# Patient Record
Sex: FEMALE
[De-identification: names, ages, dates, MRNs, and addresses within clinical notes are randomized per-mention and may not be internally consistent; named-entity substitution may affect disease eponyms.]

---

## 2017-09-05 PROBLEM — Z00.00 ENCOUNTER FOR PREVENTIVE HEALTH EXAMINATION: Status: ACTIVE | Noted: 2017-09-05

## 2023-04-12 ENCOUNTER — NON-APPOINTMENT (OUTPATIENT)
Age: 73
End: 2023-04-12

## 2023-04-13 ENCOUNTER — NON-APPOINTMENT (OUTPATIENT)
Age: 73
End: 2023-04-13

## 2023-05-10 ENCOUNTER — APPOINTMENT (OUTPATIENT)
Dept: MRI IMAGING | Facility: CLINIC | Age: 73
End: 2023-05-10

## 2023-05-17 ENCOUNTER — APPOINTMENT (OUTPATIENT)
Dept: GASTROENTEROLOGY | Facility: CLINIC | Age: 73
End: 2023-05-17
Payer: MEDICARE

## 2023-05-17 ENCOUNTER — NON-APPOINTMENT (OUTPATIENT)
Age: 73
End: 2023-05-17

## 2023-05-17 VITALS
OXYGEN SATURATION: 98 % | HEIGHT: 67 IN | DIASTOLIC BLOOD PRESSURE: 70 MMHG | HEART RATE: 47 BPM | TEMPERATURE: 98.5 F | SYSTOLIC BLOOD PRESSURE: 100 MMHG | WEIGHT: 129 LBS | BODY MASS INDEX: 20.25 KG/M2

## 2023-05-17 DIAGNOSIS — K83.8 OTHER SPECIFIED DISEASES OF BILIARY TRACT: ICD-10-CM

## 2023-05-17 DIAGNOSIS — Z86.69 PERSONAL HISTORY OF OTHER DISEASES OF THE NERVOUS SYSTEM AND SENSE ORGANS: ICD-10-CM

## 2023-05-17 DIAGNOSIS — Z78.9 OTHER SPECIFIED HEALTH STATUS: ICD-10-CM

## 2023-05-17 DIAGNOSIS — Z86.39 PERSONAL HISTORY OF OTHER ENDOCRINE, NUTRITIONAL AND METABOLIC DISEASE: ICD-10-CM

## 2023-05-17 PROCEDURE — 99204 OFFICE O/P NEW MOD 45 MIN: CPT

## 2023-05-17 NOTE — ASSESSMENT
[FreeTextEntry1] : 72 year old female with intrahepatic biliary dilation. \par \par 1) will obtain MRI/MRCP for further evaluation.  \par \par 2) Pt has mild dysphagia. It does not sound related to MG as she does not have transfer dysphagia.  Ideally she should have an EGD for evaluation but she is currently very apprehensive about undergoing a procedure and wants to hold off. She will think about it. \par \par

## 2023-05-17 NOTE — HISTORY OF PRESENT ILLNESS
[FreeTextEntry1] : 72 year old referred for dilated central intrahepatics seen on CT. \par \par Patient underwent imaging for periumbilical mass. CT showed a abd hernia and the bile duct findings. \par \par Patient also states she has trouble eating at times.  She states when she swallows fast then she has to wait as it takes time for food to go down. She does not have trouble transferring food from the oropharynx to the esophagus. \par \par Pt has never had an EGD or colonoscopy. \par \par No weight loss. \par \par BMs ok. \par \par Pt with a PMH of myasthenia gravis (under care Mike Armando MD-neurology at [facility]).

## 2023-05-17 NOTE — CONSULT LETTER
[Dear  ___] : Dear  [unfilled], [Courtesy Letter:] : I had the pleasure of seeing your patient, [unfilled], in my office today. [Please see my note below.] : Please see my note below. [Sincerely,] : Sincerely, [FreeTextEntry3] : --\par Gonzalo Armstrong MD\par  of Endoscopy, [facility] ([facility]par Director of Endoscopy, [facility]\par , [facility]\par Associate Professor of Medicine\par [facility] [employer] [facility] \par [facility]\par Phone: [phone]\par Fax: [phone]\par Email: [email or web address]\par  [FreeTextEntry2] : Demian Medina DO\par [address]\[place] [address]\par Fax: [phone]\par \par

## 2023-06-12 ENCOUNTER — APPOINTMENT (OUTPATIENT)
Dept: MRI IMAGING | Facility: CLINIC | Age: 73
End: 2023-06-12

## 2023-06-21 ENCOUNTER — APPOINTMENT (OUTPATIENT)
Dept: MRI IMAGING | Facility: CLINIC | Age: 73
End: 2023-06-21

## 2024-02-15 ENCOUNTER — OUTPATIENT (OUTPATIENT)
Dept: OUTPATIENT SERVICES | Facility: HOSPITAL | Age: 74
LOS: 1 days | End: 2024-02-15
Payer: MEDICARE

## 2024-02-15 ENCOUNTER — RESULT REVIEW (OUTPATIENT)
Age: 74
End: 2024-02-15

## 2024-02-15 DIAGNOSIS — C80.1 MALIGNANT (PRIMARY) NEOPLASM, UNSPECIFIED: ICD-10-CM

## 2024-02-15 PROCEDURE — 88321 CONSLTJ&REPRT SLD PREP ELSWR: CPT

## 2024-02-16 ENCOUNTER — APPOINTMENT (OUTPATIENT)
Dept: GYNECOLOGIC ONCOLOGY | Facility: CLINIC | Age: 74
End: 2024-02-16
Payer: MEDICARE

## 2024-02-16 VITALS
DIASTOLIC BLOOD PRESSURE: 88 MMHG | BODY MASS INDEX: 20.73 KG/M2 | WEIGHT: 129 LBS | HEART RATE: 58 BPM | HEIGHT: 66 IN | SYSTOLIC BLOOD PRESSURE: 157 MMHG

## 2024-02-16 DIAGNOSIS — N85.02 ENDOMETRIAL INTRAEPITHELIAL NEOPLASIA [EIN]: ICD-10-CM

## 2024-02-16 DIAGNOSIS — N90.89 OTHER SPECIFIED NONINFLAMMATORY DISORDERS OF VULVA AND PERINEUM: ICD-10-CM

## 2024-02-16 PROCEDURE — 99205 OFFICE O/P NEW HI 60 MIN: CPT

## 2024-02-16 RX ORDER — LEVOTHYROXINE SODIUM 125 UG/1
125 TABLET ORAL
Refills: 0 | Status: ACTIVE | COMMUNITY

## 2024-02-16 RX ORDER — CLOTRIMAZOLE AND BETAMETHASONE DIPROPIONATE 10; .5 MG/G; MG/G
1-0.05 CREAM TOPICAL TWICE DAILY
Qty: 1 | Refills: 0 | Status: ACTIVE | COMMUNITY
Start: 2024-02-16 | End: 1900-01-01

## 2024-02-16 RX ORDER — PYRIDOSTIGMINE BROMIDE 60 MG/1
60 TABLET ORAL
Refills: 0 | Status: ACTIVE | COMMUNITY

## 2024-02-16 NOTE — PROCEDURE
[Endometrial Biopsy] : an endometrial biopsy [Postmenopausal Bleeding] : postmenopausal bleeding [Patient] : the patient [Site Verification] : site verification performed. [Time Out] : Time Out Procedure:The following was confirmed prior to the procedure: Correct patient identity, correct site, agreement on the procedure to be done and correct patient position. [Betadine] : betadine [Yes] : the specimen was sent to pathology [No Complications] : none [Tolerated Well] : the patient tolerated the procedure well

## 2024-02-16 NOTE — DISCUSSION/SUMMARY
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 MATA  in detail.  I am recommending hysterectomy/bilateral salpingo-oophorectomy and possible staging.  The risk of endometrial adenocarcinoma with EIN is approximately 50%.  We plan for minimally invasive approach with robotic assisted surgery.  Risks/benefits/alternatives were discussed.  Risks include bleeding/blood transfusion/infection/injury to bowel/bladder/ureter/blood vessels/conversion to laparotomy.  Alternatives to hysterectomy include hormonal therapy, which is inferior in efficacy to hysterectomy and used in patients who desire future fertility or for whom surgery is precluded due to medical comorbidities.  Postoperative expectations and recovery were discussed in detail.  Follow up sonogram will be obtained as well as endometrial biopsy today to evaluate for interval change.  I will contact patient with results and discuss further steps.

## 2024-02-16 NOTE — HISTORY OF PRESENT ILLNESS
[FreeTextEntry1] : Referred by (GYN) Dr. Cheatham PCP: Dr. Nelson  Ms. MATA is a nulligravida 73 year old female, referred from Dr. Cheatham, for postmenopausal bleeding, with a  diagnosis of AEH.  She reports chronic postmenopausal bleeding.  Pelvic sonogram in 2/2022 revealed EMS of 23.36mm, and pelvic sonogram in 4/2023 revealed EMS of 22mm.  Endometrial biopsy was performed in 4/2023 and revealed foci of atypical endometrial hyperplasia.  She was referred to gyn oncology but did not follow up until today.  She reports persistent vaginal spotting.  She is hesitant to pursue hysterectomy and this is the reason she did not follow up.  PMHx- hypothyroidism, myasthenia gravis PSHx- myomectomy in 1980 Family hx of cancer- n/a    She denies pelvic pain/pressure. She denies bloating, abdominal distention or change in bowel or urinary habits.  She denies recent weight changes.  She denies nausea/vomiting. She denies all other associated signs and symptoms.  She is here today to discuss further surgical management.   HM Pap- 4/2023 (-) HRHPV (-) Mammo- 2023- negative  Colonoscopy- n/a

## 2024-02-16 NOTE — PHYSICAL EXAM
[Chaperone Present] : A chaperone was present in the examining room during all aspects of the physical examination [Normal] : Recto-Vaginal Exam: Normal [de-identified] : vertical midline scar

## 2024-02-16 NOTE — REVIEW OF SYSTEMS
[Negative] : Musculoskeletal [Hypothyroidism] : hypothyroidism [Abn Vag Bleeding] : abnormal vaginal bleeding

## 2024-02-20 LAB — SURGICAL PATHOLOGY STUDY: SIGNIFICANT CHANGE UP

## 2024-02-27 LAB — CORE LAB BIOPSY: NORMAL
